# Patient Record
Sex: FEMALE
[De-identification: names, ages, dates, MRNs, and addresses within clinical notes are randomized per-mention and may not be internally consistent; named-entity substitution may affect disease eponyms.]

---

## 2022-10-06 ENCOUNTER — RESULT REVIEW (OUTPATIENT)
Age: 57
End: 2022-10-06

## 2022-11-11 PROBLEM — Z00.00 ENCOUNTER FOR PREVENTIVE HEALTH EXAMINATION: Status: ACTIVE | Noted: 2022-11-11

## 2022-11-28 ENCOUNTER — APPOINTMENT (OUTPATIENT)
Dept: OTOLARYNGOLOGY | Facility: CLINIC | Age: 57
End: 2022-11-28

## 2022-11-28 VITALS
WEIGHT: 200 LBS | SYSTOLIC BLOOD PRESSURE: 131 MMHG | HEART RATE: 92 BPM | OXYGEN SATURATION: 95 % | BODY MASS INDEX: 33.32 KG/M2 | DIASTOLIC BLOOD PRESSURE: 82 MMHG | TEMPERATURE: 97.8 F | HEIGHT: 65 IN

## 2022-11-28 DIAGNOSIS — Z78.9 OTHER SPECIFIED HEALTH STATUS: ICD-10-CM

## 2022-11-28 DIAGNOSIS — J34.2 DEVIATED NASAL SEPTUM: ICD-10-CM

## 2022-11-28 DIAGNOSIS — J00 ACUTE NASOPHARYNGITIS [COMMON COLD]: ICD-10-CM

## 2022-11-28 DIAGNOSIS — Z86.79 PERSONAL HISTORY OF OTHER DISEASES OF THE CIRCULATORY SYSTEM: ICD-10-CM

## 2022-11-28 DIAGNOSIS — Z80.9 FAMILY HISTORY OF MALIGNANT NEOPLASM, UNSPECIFIED: ICD-10-CM

## 2022-11-28 PROCEDURE — 99205 OFFICE O/P NEW HI 60 MIN: CPT | Mod: 25

## 2022-11-28 PROCEDURE — 99072 ADDL SUPL MATRL&STAF TM PHE: CPT

## 2022-11-28 PROCEDURE — 31575 DIAGNOSTIC LARYNGOSCOPY: CPT

## 2022-11-28 RX ORDER — LORAZEPAM 0.5 MG/1
0.5 TABLET ORAL
Qty: 10 | Refills: 0 | Status: ACTIVE | COMMUNITY
Start: 2022-09-10

## 2022-11-28 RX ORDER — ESCITALOPRAM OXALATE 10 MG/1
10 TABLET, FILM COATED ORAL
Refills: 0 | Status: ACTIVE | COMMUNITY

## 2022-11-28 RX ORDER — HYDROCHLOROTHIAZIDE 25 MG/1
25 TABLET ORAL
Qty: 30 | Refills: 0 | Status: ACTIVE | COMMUNITY
Start: 2022-09-08

## 2022-11-28 RX ORDER — ESCITALOPRAM OXALATE 10 MG/1
10 TABLET ORAL
Qty: 30 | Refills: 0 | Status: ACTIVE | COMMUNITY
Start: 2022-09-11

## 2022-11-28 RX ORDER — IVERMECTIN 10 MG/G
1 CREAM TOPICAL
Qty: 45 | Refills: 0 | Status: ACTIVE | COMMUNITY
Start: 2022-08-10

## 2022-11-28 RX ORDER — CEPHALEXIN 500 MG/1
500 CAPSULE ORAL
Qty: 10 | Refills: 0 | Status: ACTIVE | COMMUNITY
Start: 2022-09-08

## 2022-11-28 NOTE — PROCEDURE
[Image(s) Captured] : image(s) captured and filed [Unable to Cooperate with Mirror] : patient unable to cooperate with mirror [Gag Reflex] : gag reflex preventing mirror examination [Topical Lidocaine] : topical lidocaine [Oxymetazoline HCl] : oxymetazoline HCl [Flexible Endoscope] : examined with the flexible endoscope [Serial Number: ___] : Serial Number: [unfilled] [de-identified] : The nasal septum is minimally deviated to the right with a spur.  There are no masses or polyps and the nasal mucosa and secretions are thick but not purulent on the right.  The choanae and posterior nasopharynx are normal without masses or drainage. The Eustachian tube orifices appear patent. The pharynx, including the posterior and lateral pharyngeal walls, the vallecula and base of tongue are normal without ulcerations, lesions or masses. The hypopharynx including the pyriform sinuses open well without pooling of secretions, mucosal lesions or masses. The supraglottic larynx including the epiglottis, petiole, arytenoids, glossoepiglottic, aryepiglottic and pharyngoepiglottic folds are normal without mucosal lesions, ulcerations or masses. The glottis reveals normal false vocal folds. The true vocal folds are glistening white, tense and of equal length, without paralysis, having symmetric mobility on adduction and abduction. There are no mucosal lesions, nodules, cysts, erythroplasia or leukoplakia. The posterior cricoid area has healthy pink mucosa in the interarytenoid area and esophageal inlet. There is [no] thickening/pachydermia of the interarytenoid mucosa suggestive of posterior laryngitis from laryngopharyngeal acid reflux disease. The trachea is clear without narrowing in the immediate subglottic region, without deviation or lesions.  [de-identified] : thyroid neoplasm, GERD

## 2022-11-28 NOTE — CONSULT LETTER
[Dear  ___] : Dear  [unfilled], [Consult Letter:] : I had the pleasure of evaluating your patient, [unfilled]. [Please see my note below.] : Please see my note below. [Consult Closing:] : Thank you very much for allowing me to participate in the care of this patient.  If you have any questions, please do not hesitate to contact me. [Sincerely,] : Sincerely, [FreeTextEntry3] : \par Jax Sanabria M.D., FACS, ECNU\par Director [facility] for Thyroid [facility] Parathyroid Surgery at [facility]\par [facility] [place]\par Certified in Thyroid/Parathyroid/Neck Ultrasound, ECNU/ AIUM\par , Department of Otolaryngology\par [facility] [employer] at [facility]\par

## 2022-11-28 NOTE — HISTORY OF PRESENT ILLNESS
[de-identified] : Licha is a generally healthy 57-year-old female  who was noted to have a NTMNG ~ 5 years ago.  She was found to have multiple subcentimeter nodules and cysts in both lobes of her thyroid gland and isthmus.  However a left mid lobe nodule had measured 2.7 cm in greatest dimension back in 2020 and had shown some marginal growth more recently measuring up to 3.2 cm and reported as a TI-RADS TR–4.  An FNA biopsy was obtained on 10/25/2022 revealing a follicular lesion of undetermined significance, [place] category III.  The ThyroSeq mutation analysis however, was negative with the exception of an EIF1AX non-splice-site mutation indication an approximately 5% risk of malignancy (these nodules may undergo clonal progression and require additional transforming mutations such as NIELS). She is euthyroid.  Licha denies recent shortness of breath, voice changes, true dysphagia, anterior neck pain, neck pressure or mass. She does feel a globus sensation in certain neck positions but admits to being very sensitive. There is no family history of thyroid cancer. She denies any known radiation exposures in her youth. She is obese and does complain of frequent pyrosis (2 x week).  She manages this with OTC antacids.

## 2022-11-28 NOTE — REASON FOR VISIT
[FreeTextEntry2] : a surgical consultation concerning a solid left lobe nodule with interval growth and indeterminate cytopathology, [place] III but negative on ThroSeq mutation profile [FreeTextEntry1] : No PCP,  GYN Zeny Quiroga MD, Referred by Shasha Kebede MD Endocrinologist

## 2022-11-28 NOTE — DATA REVIEWED
[de-identified] : see HPI  [de-identified] : see HPI  [de-identified] : Cytology report reviewed

## 2024-03-03 ENCOUNTER — TRANSCRIPTION ENCOUNTER (OUTPATIENT)
Age: 59
End: 2024-03-03

## 2024-03-14 ENCOUNTER — APPOINTMENT (OUTPATIENT)
Dept: OTOLARYNGOLOGY | Facility: CLINIC | Age: 59
End: 2024-03-14
Payer: COMMERCIAL

## 2024-03-14 VITALS
BODY MASS INDEX: 28.32 KG/M2 | TEMPERATURE: 98 F | HEIGHT: 65 IN | DIASTOLIC BLOOD PRESSURE: 73 MMHG | SYSTOLIC BLOOD PRESSURE: 110 MMHG | WEIGHT: 170 LBS | OXYGEN SATURATION: 99 % | HEART RATE: 82 BPM

## 2024-03-14 DIAGNOSIS — E04.1 NONTOXIC SINGLE THYROID NODULE: ICD-10-CM

## 2024-03-14 DIAGNOSIS — J04.0 ACUTE LARYNGITIS: ICD-10-CM

## 2024-03-14 DIAGNOSIS — D44.0 NEOPLASM OF UNCERTAIN BEHAVIOR OF THYROID GLAND: ICD-10-CM

## 2024-03-14 DIAGNOSIS — K21.9 ACUTE LARYNGITIS: ICD-10-CM

## 2024-03-14 DIAGNOSIS — R22.1 LOCALIZED SWELLING, MASS AND LUMP, NECK: ICD-10-CM

## 2024-03-14 LAB
25(OH)D3 SERPL-MCNC: 25.4 NG/ML
ALBUMIN SERPL ELPH-MCNC: 4.5 G/DL
ALP BLD-CCNC: 80 U/L
ALT SERPL-CCNC: 19 U/L
ANION GAP SERPL CALC-SCNC: 12 MMOL/L
AST SERPL-CCNC: 12 U/L
BILIRUB SERPL-MCNC: 0.2 MG/DL
BUN SERPL-MCNC: 19 MG/DL
CALCIUM SERPL-MCNC: 10 MG/DL
CALCIUM SERPL-MCNC: 10 MG/DL
CHLORIDE SERPL-SCNC: 107 MMOL/L
CO2 SERPL-SCNC: 23 MMOL/L
CREAT SERPL-MCNC: 1.08 MG/DL
EGFR: 59 ML/MIN/1.73M2
GLUCOSE SERPL-MCNC: 98 MG/DL
PARATHYROID HORMONE INTACT: 44 PG/ML
POTASSIUM SERPL-SCNC: 5 MMOL/L
PROT SERPL-MCNC: 6.7 G/DL
SODIUM SERPL-SCNC: 142 MMOL/L
T4 FREE SERPL-MCNC: 1.1 NG/DL
TSH SERPL-ACNC: 2.28 UIU/ML

## 2024-03-14 PROCEDURE — 76536 US EXAM OF HEAD AND NECK: CPT

## 2024-03-14 PROCEDURE — 31575 DIAGNOSTIC LARYNGOSCOPY: CPT

## 2024-03-14 PROCEDURE — 99205 OFFICE O/P NEW HI 60 MIN: CPT | Mod: 25

## 2024-03-14 RX ORDER — TIRZEPATIDE 10 MG/.5ML
10 INJECTION, SOLUTION SUBCUTANEOUS
Refills: 0 | Status: ACTIVE | COMMUNITY
Start: 2024-03-14

## 2024-03-14 NOTE — DATA REVIEWED
[de-identified] : see HPI  [de-identified] : see HPI  [de-identified] : Cytology report reviewed

## 2024-03-14 NOTE — HISTORY OF PRESENT ILLNESS
[de-identified] : Licha is a generally healthy 57-year-old female  who was noted to have a NTMNG ~ 5 years ago.  She was found to have multiple subcentimeter nodules and cysts in both lobes of her thyroid gland and isthmus.  However a left mid lobe nodule had measured 2.7 cm in greatest dimension back in 2020 and had shown some marginal growth more recently measuring up to 3.2 cm and reported as a TI-RADS TR-4.  An FNA biopsy was obtained on 10/25/2022 revealing a follicular lesion of undetermined significance, [place] category III.  The ThyroSeq mutation analysis however, was negative with the exception of an EIF1AX non-splice-site mutation indication an approximately 5% risk of malignancy (these nodules may undergo clonal progression and require additional transforming mutations such as NIELS). She is euthyroid.  Licha denies recent shortness of breath, voice changes, true dysphagia, anterior neck pain, neck pressure or mass. She does feel a globus sensation in certain neck positions but admits to being very sensitive. There is no family history of thyroid cancer. She denies any known radiation exposures in her youth. She is obese and does complain of frequent pyrosis (2 x week).  She manages this with OTC antacids.  -------------------------------------------------------------------------------------------------------------------------------------------------------------------------------------  [FreeTextEntry1] : Licha returns for f/u concerning a nontoxic multinodular goiter and a dominant nodule in the left mid lobe measuring up to 3.2 cm, biopsied in October 2022 and indeterminate [place] category III, described as a follicular lesion of undetermined significance.  On further molecular analysis there is a EIF1AX non-splice-site mutation indication an approximately 5% risk of malignancy (these nodules may undergo clonal progression and require additional transforming mutations over time such as NIELS). Her last ultrasound was in 2022. [place] [facility] [place] follow up needed for years. She is taking Mounjaro for weight reduction and feels globus sensation when swallowing with head down.  She has panic attacks that she feesl is related to weight loss drugs.  Licha denies recent shortness of breath, voice changes, dysphagia, anterior neck pain, neck pressure or mass.  She is euthyroid.  She denies fever, body aches, cough, cyanosis, chest burning, anosmia or recent known COVID exposures.  All family members at home are well. She was vaccinated but no recent boosters.

## 2024-03-14 NOTE — CONSULT LETTER
[Dear  ___] : Dear  [unfilled], [Consult Letter:] : I had the pleasure of evaluating your patient, [unfilled]. [Consult Closing:] : Thank you very much for allowing me to participate in the care of this patient.  If you have any questions, please do not hesitate to contact me. [Please see my note below.] : Please see my note below. [Sincerely,] : Sincerely, [FreeTextEntry3] : \par  Jax Sanabria M.D., FACS, ECNU\par  Director Center for Thyroid & Parathyroid Surgery at [facility]\par  [facility] [place]\par  Certified in Thyroid/Parathyroid/Neck Ultrasound, ECNU/ AIUM\par  , Department of Otolaryngology\par  [facility] at [facility]\par

## 2024-03-14 NOTE — REASON FOR VISIT
[FreeTextEntry2] : a surgical consultation concerning a solid left lobe nodule with interval growth and indeterminate cytopathology, [place] III but negative on ThroSeq mutation profile [FreeTextEntry1] : No PCP,  GYN Zeny Quiroga MD, Referred by Shasha Kebede MD Endocrinologist, [facility] is Dr. Bassem Ramos at [facility].

## 2024-03-14 NOTE — PROCEDURE
[Image(s) Captured] : image(s) captured and filed [Unable to Cooperate with Mirror] : patient unable to cooperate with mirror [Gag Reflex] : gag reflex preventing mirror examination [Topical Lidocaine] : topical lidocaine [Oxymetazoline HCl] : oxymetazoline HCl [Flexible Endoscope] : examined with the flexible endoscope [Serial Number: ___] : Serial Number: [unfilled] [FreeTextEntry3] : [facility] THYROID/NECK ULTRASOUND REPORT  NAME: WEIL, ALISON .Angela...           MR# [id number].....	              : 1965.....	         DATE: 3/14/2024.  HISTORY/ INDICATIONS: A 58-year-old female being monitored for a dominant nodule in the left lobe measuring up to 3.2 cm and biopsied in  found to have a nonsplice site mutation with an only 5% risk of malignancy.   COMPARISON: Prior office ultrasound 2022.  PROCEDURE: Physician performed high-resolution ultrasound gray scale imaging and color Doppler supplementation of the thyroid gland and neck was obtained in the longitudinal and transverse planes using a 13 MHz linear transducer with image capture.  All measurements are in centimeters (longitudinal x AP x transverse).    FINDINGS: Overall, the thyroid gland is normal in size, heterogeneous in echotexture with normal vascularity on color Doppler flow. with 3 dominant nodules including a right mid lobe simple cyst measuring up to 2.2 cm in greatest dimension, a dominant left mid to lower lobe nodule, essentially stable, heterogeneous and cystic, and a left mid to upper lobe solid nodule measuring up to 1 cm in greatest dimension and has possible microcalcifications, TI-RADS, TR-4.  There are multiple sub centimeter bilateral cystic and complex nodules. There are no enlarged or morphologically abnormal appearing cervical lymph nodes.    RIGHT LOBE: Is not enlarged, heterogeneous, with normal vascularity on color Doppler and measures 4.86 x 2.05 x 2.26 cm.  NODULES: Within the right mid lobe, there is a smoothly marginated simple cyst that measures 2.22 x 1.70 x 1.73 cm TI-RADS TR 1.  ISTHMUS: Measures 0.21 cm in AP dimension and is heterogeneous in echotexture with normal vascularity.  There is a sub centimeter simple cyst in the left isthmus.  LEFT LOBE: Is not enlarged, heterogeneous, with normal vascularity on color Doppler and measures 5.00 x 1.93 x 2.60 cm. NODULES: There are 2 dominant nodules within the left lobe including a left mid to lower complex nodule that is smoothly marginated, has no microcalcifications, is wider than tall with peripheral vascularity measuring 3.06 x 1.56 x 2.20 cm (previously measuring (2.89 x 1.74 x 1.68 cm), essentially unchanged. A second nodule is identified in the left mid to upper lobe that is wider than tall smoothly marginated with several punctate echogenic foci grade 2 vascularity and measures 1.03 x 0.48 x 0.96 cm, TI-RADS TR 4.  PARATHYROID GLANDS: There are no identified enlarged parathyroid glands in the central neck compartment.   LYMPH NODES: Bilateral neck levels I - VI were examined.  There are several benign appearing sub centimeter lymph nodes identified at neck levels II- III bilaterally (lateral neck), all with echogenic hilar lines, no calcifications or cystic degeneration and have a short, long axis ratio < 0.5 in the transverse plane.  There are no enlarged or abnormal appearing central compartment, level VI lymph nodes.  IMPRESSION: A 58-year-old female with 3 dominant nodules including a right mid lobe simple cyst measuring up to 2.2 cm in greatest dimension, a dominant left mid to lower lobe nodule, essentially stable, heterogeneous and cystic, and a left mid to upper lobe solid nodule measuring up to 1 cm in greatest dimension and has possible microcalcifications, TI-RADS, TR-4.  There are multiple sub centimeter bilateral cystic and complex nodules. There are no enlarged or morphologically abnormal appearing cervical lymph nodes.    RECOMMENDATIONS: Repeat thyroid ultrasound in 1 year as well as continued monitoring of TSH.  If there is continuing growth of the left dominant nodule or the left anterior nodule, FNA biopsy should be considered.  Electronically signed by referring, interpreting and reporting physician Jax Sanabria MD on 3/14/2024, 10:15 AM.  [facility] [address], [address], [place] [address] [phone] (voice), [phone] (fax).   [de-identified] : The nasal septum is minimally deviated to the right with a spur.  There are no masses or polyps, and the nasal mucosa and secretions are thick but not purulent on the right.  The choanae and posterior nasopharynx are normal without masses or drainage. The Eustachian tube orifices appear patent. The pharynx, including the posterior and lateral pharyngeal walls, the vallecula and base of tongue are normal without ulcerations, lesions or masses. The hypopharynx including the pyriform sinuses open well without pooling of secretions, mucosal lesions or masses. The supraglottic larynx including the epiglottis, petiole, arytenoids, glossoepiglottic, aryepiglottic and pharyngoepiglottic folds are normal without mucosal lesions, ulcerations or masses. The glottis reveals normal false vocal folds. The true vocal folds are glistening white, tense and of equal length, without paralysis, having symmetric mobility on adduction and abduction. There are no mucosal lesions, nodules, cysts, erythroplasia or leukoplakia. The posterior cricoid area has healthy pink mucosa in the interarytenoid area and esophageal inlet. There is minimal thickening/pachydermia of the interarytenoid mucosa suggestive of posterior laryngitis from laryngopharyngeal acid reflux disease. The trachea is clear without narrowing in the immediate subglottic region, without deviation or lesions.  [de-identified] : thyroid neoplasm